# Patient Record
Sex: FEMALE | Race: OTHER | Employment: UNEMPLOYED | URBAN - METROPOLITAN AREA
[De-identification: names, ages, dates, MRNs, and addresses within clinical notes are randomized per-mention and may not be internally consistent; named-entity substitution may affect disease eponyms.]

---

## 2017-05-24 DIAGNOSIS — E76.219 MORQUIO SYNDROME (HCC): Primary | ICD-10-CM

## 2018-03-08 ENCOUNTER — HOSPITAL ENCOUNTER (OUTPATIENT)
Age: 38
Setting detail: SPECIMEN
Discharge: HOME | End: 2018-03-08
Payer: COMMERCIAL

## 2018-03-08 ENCOUNTER — OFFICE VISIT (OUTPATIENT)
Dept: FAMILY MEDICINE CLINIC | Age: 38
End: 2018-03-08
Payer: COMMERCIAL

## 2018-03-08 VITALS
OXYGEN SATURATION: 98 % | WEIGHT: 91.6 LBS | HEIGHT: 55 IN | DIASTOLIC BLOOD PRESSURE: 75 MMHG | SYSTOLIC BLOOD PRESSURE: 111 MMHG | HEART RATE: 72 BPM | BODY MASS INDEX: 21.2 KG/M2

## 2018-03-08 DIAGNOSIS — R07.89 CHEST DISCOMFORT: ICD-10-CM

## 2018-03-08 DIAGNOSIS — Z00.01 ENCOUNTER FOR WELL ADULT EXAM WITH ABNORMAL FINDINGS: Primary | ICD-10-CM

## 2018-03-08 DIAGNOSIS — M77.8 RIGHT ELBOW TENDINITIS: ICD-10-CM

## 2018-03-08 DIAGNOSIS — Z00.01 ENCOUNTER FOR WELL ADULT EXAM WITH ABNORMAL FINDINGS: ICD-10-CM

## 2018-03-08 LAB
-: ABNORMAL
ABSOLUTE EOS #: 0.15 K/UL (ref 0–0.44)
ABSOLUTE IMMATURE GRANULOCYTE: <0.03 K/UL (ref 0–0.3)
ABSOLUTE LYMPH #: 2.43 K/UL (ref 1.1–3.7)
ABSOLUTE MONO #: 0.49 K/UL (ref 0.1–1.2)
ALBUMIN SERPL-MCNC: 4.4 G/DL (ref 3.5–5.2)
ALBUMIN/GLOBULIN RATIO: 1.4 (ref 1–2.5)
ALP BLD-CCNC: 44 U/L (ref 35–104)
ALT SERPL-CCNC: 12 U/L (ref 5–33)
AMORPHOUS: ABNORMAL
ANION GAP SERPL CALCULATED.3IONS-SCNC: 15 MMOL/L (ref 9–17)
AST SERPL-CCNC: 19 U/L
BACTERIA: ABNORMAL
BASOPHILS # BLD: 1 % (ref 0–2)
BASOPHILS ABSOLUTE: 0.05 K/UL (ref 0–0.2)
BILIRUB SERPL-MCNC: 0.38 MG/DL (ref 0.3–1.2)
BILIRUBIN URINE: NEGATIVE
BUN BLDV-MCNC: 14 MG/DL (ref 6–20)
BUN/CREAT BLD: ABNORMAL (ref 9–20)
CALCIUM SERPL-MCNC: 9.5 MG/DL (ref 8.6–10.4)
CASTS UA: ABNORMAL /LPF (ref 0–2)
CHLORIDE BLD-SCNC: 100 MMOL/L (ref 98–107)
CHOLESTEROL/HDL RATIO: 2.4
CHOLESTEROL: 181 MG/DL
CO2: 20 MMOL/L (ref 20–31)
COLOR: YELLOW
COMMENT UA: ABNORMAL
CREAT SERPL-MCNC: 0.42 MG/DL (ref 0.5–0.9)
CRYSTALS, UA: ABNORMAL /HPF
DIFFERENTIAL TYPE: NORMAL
EOSINOPHILS RELATIVE PERCENT: 3 % (ref 1–4)
EPITHELIAL CELLS UA: ABNORMAL /HPF (ref 0–5)
ESTIMATED AVERAGE GLUCOSE: 94 MG/DL
GFR AFRICAN AMERICAN: >60 ML/MIN
GFR NON-AFRICAN AMERICAN: >60 ML/MIN
GFR SERPL CREATININE-BSD FRML MDRD: ABNORMAL ML/MIN/{1.73_M2}
GFR SERPL CREATININE-BSD FRML MDRD: ABNORMAL ML/MIN/{1.73_M2}
GLUCOSE BLD-MCNC: 77 MG/DL (ref 70–99)
GLUCOSE URINE: NEGATIVE
HBA1C MFR BLD: 4.9 % (ref 4–6)
HCT VFR BLD CALC: 38.2 % (ref 36.3–47.1)
HDLC SERPL-MCNC: 75 MG/DL
HEMOGLOBIN: 12 G/DL (ref 11.9–15.1)
IMMATURE GRANULOCYTES: 0 %
KETONES, URINE: NEGATIVE
LDL CHOLESTEROL: 97 MG/DL (ref 0–130)
LEUKOCYTE ESTERASE, URINE: ABNORMAL
LYMPHOCYTES # BLD: 41 % (ref 24–43)
MCH RBC QN AUTO: 29.6 PG (ref 25.2–33.5)
MCHC RBC AUTO-ENTMCNC: 31.4 G/DL (ref 28.4–34.8)
MCV RBC AUTO: 94.1 FL (ref 82.6–102.9)
MONOCYTES # BLD: 8 % (ref 3–12)
MUCUS: ABNORMAL
NITRITE, URINE: NEGATIVE
NRBC AUTOMATED: 0 PER 100 WBC
OTHER OBSERVATIONS UA: ABNORMAL
PDW BLD-RTO: 12.7 % (ref 11.8–14.4)
PH UA: 6 (ref 5–8)
PLATELET # BLD: 443 K/UL (ref 138–453)
PLATELET ESTIMATE: NORMAL
PMV BLD AUTO: 11.3 FL (ref 8.1–13.5)
POTASSIUM SERPL-SCNC: 4.5 MMOL/L (ref 3.7–5.3)
PROTEIN UA: NEGATIVE
RBC # BLD: 4.06 M/UL (ref 3.95–5.11)
RBC # BLD: NORMAL 10*6/UL
RBC UA: ABNORMAL /HPF (ref 0–2)
RENAL EPITHELIAL, UA: ABNORMAL /HPF
SEG NEUTROPHILS: 47 % (ref 36–65)
SEGMENTED NEUTROPHILS ABSOLUTE COUNT: 2.76 K/UL (ref 1.5–8.1)
SODIUM BLD-SCNC: 135 MMOL/L (ref 135–144)
SPECIFIC GRAVITY UA: 1.02 (ref 1–1.03)
THYROXINE, FREE: 1.62 NG/DL (ref 0.93–1.7)
TOTAL PROTEIN: 7.5 G/DL (ref 6.4–8.3)
TRICHOMONAS: ABNORMAL
TRIGL SERPL-MCNC: 44 MG/DL
TSH SERPL DL<=0.05 MIU/L-ACNC: 1.6 MIU/L (ref 0.3–5)
TURBIDITY: ABNORMAL
URINE HGB: ABNORMAL
UROBILINOGEN, URINE: NORMAL
VLDLC SERPL CALC-MCNC: NORMAL MG/DL (ref 1–30)
WBC # BLD: 5.9 K/UL (ref 3.5–11.3)
WBC # BLD: NORMAL 10*3/UL
WBC UA: ABNORMAL /HPF (ref 0–5)
YEAST: ABNORMAL

## 2018-03-08 PROCEDURE — 99213 OFFICE O/P EST LOW 20 MIN: CPT | Performed by: FAMILY MEDICINE

## 2018-03-08 PROCEDURE — 93000 ELECTROCARDIOGRAM COMPLETE: CPT | Performed by: FAMILY MEDICINE

## 2018-03-08 PROCEDURE — 99395 PREV VISIT EST AGE 18-39: CPT | Performed by: FAMILY MEDICINE

## 2018-03-08 RX ORDER — BACLOFEN 10 MG/1
10 TABLET ORAL NIGHTLY
Qty: 30 TABLET | Refills: 1 | Status: SHIPPED | OUTPATIENT
Start: 2018-03-08

## 2018-03-08 RX ORDER — RANITIDINE 150 MG/1
150 TABLET ORAL 2 TIMES DAILY
Qty: 60 TABLET | Refills: 3 | Status: SHIPPED | OUTPATIENT
Start: 2018-03-08

## 2018-03-08 RX ORDER — IBUPROFEN 200 MG
200 TABLET ORAL EVERY 6 HOURS PRN
Qty: 120 TABLET | Refills: 3 | Status: SHIPPED | OUTPATIENT
Start: 2018-03-08

## 2018-03-08 NOTE — PROGRESS NOTES
normal.  She exhibits no distension and no mass. There is no tenderness. There is no rebound and no guarding. Musculoskeletal: Normal range of motion. She exhibits no edema. Tenderness at the posterior R elbow area, especially with palpitation of the tendons and musculoskeletal parts. .   Lymphadenopathy:    She has no cervical adenopathy. Neurological:  She is alert and oriented to person, place, and time. Cranial nerves grossly intact. No sensation problem noted. Muscle strength 5/5 throughout. Skin: Skin is warm and dry. No rash noted. No erythema. Psychiatric:  She has a normal mood and affect. Behavior is normal.    EKG was of normal limits. Sinus rhythm, rate, with normal limits. No ST changes. Charla Hammer was seen today for other and elbow pain. Diagnoses and all orders for this visit:    Encounter for well adult exam with abnormal findings  -     [facility] - [facility]  -     baclofen (LIORESAL) 10 MG tablet; Take 1 tablet by mouth nightly  -     ibuprofen (ADVIL) 200 MG tablet; Take 1 tablet by mouth every 6 hours as needed for Pain  -     ranitidine (ZANTAC) 150 MG tablet; Take 1 tablet by mouth 2 times daily  -     CBC Auto Differential; Future  -     Comprehensive Metabolic Panel; Future  -     Urinalysis; Future  -     TSH without Reflex; Future  -     T4, Free; Future  -     Lipid Panel; Future  -     Hemoglobin A1C; Future  -     Thyroid Peroxidase Antibody; Future    Right elbow tendinitis  -     [facility] - [facility]  -     baclofen (LIORESAL) 10 MG tablet; Take 1 tablet by mouth nightly  -     ibuprofen (ADVIL) 200 MG tablet; Take 1 tablet by mouth every 6 hours as needed for Pain    Chest discomfort  -     EKG 12 Lead  -     XR CHEST STANDARD (2 VW); Future     yearly blood work ordered. Patient will see physical therapy. start baclofen and ADVIL. Call a neurologist for follow-up appointment as needed. I think the patient has acid reflux.   She will start

## 2018-03-09 LAB — THYROID PEROXIDASE (TPO) AB: <10 IU/ML (ref 0–35)

## 2018-03-19 ENCOUNTER — HOSPITAL ENCOUNTER (OUTPATIENT)
Dept: PHYSICAL THERAPY | Facility: CLINIC | Age: 38
Setting detail: THERAPIES SERIES
Discharge: HOME | End: 2018-03-19
Payer: COMMERCIAL

## 2018-03-19 NOTE — FLOWSHEET NOTE
[] Ferdinand Seip        Outpatient Physical                Therapy       [address]       Phone: [phone]       Fax: [phone] [x] [facility]       Promotion at [address] [place] [address]       Phone: [phone]       Fax: [phone] [] [facility]      for [facility]     [address] [place] [address]      Phone: [phone]      Fax:  [phone]     Physical Therapy Cancel/No Show note    Date: 3/19/2018  Patient: Deborah Nguyen  : 1980  MRN: [id number]    Cancels/No Shows to date:     For today's appointment patient:  [x]  Cancelled  []  Rescheduled appointment  []  No-show     Reason given by patient:  []  Patient ill  []  Conflicting appointment  []  No transportation    []  Conflict with work  []  No reason given  []  Weather related  [x]  Other:   Pt wanted to reschedule until after she returns from Spring break.   Did schedule for 18      [x]  Next appointment was confirmed    Electronically signed by: Xiao Fonseca PT

## 2018-04-09 ENCOUNTER — HOSPITAL ENCOUNTER (OUTPATIENT)
Dept: PHYSICAL THERAPY | Facility: CLINIC | Age: 38
Setting detail: THERAPIES SERIES
Discharge: HOME | End: 2018-04-09
Payer: COMMERCIAL

## 2018-04-09 PROCEDURE — 97110 THERAPEUTIC EXERCISES: CPT

## 2018-04-09 PROCEDURE — 97161 PT EVAL LOW COMPLEX 20 MIN: CPT

## 2018-04-13 ENCOUNTER — HOSPITAL ENCOUNTER (OUTPATIENT)
Dept: PHYSICAL THERAPY | Facility: CLINIC | Age: 38
Setting detail: THERAPIES SERIES
Discharge: HOME | End: 2018-04-13
Payer: COMMERCIAL

## 2018-04-13 PROCEDURE — 97140 MANUAL THERAPY 1/> REGIONS: CPT

## 2018-04-13 PROCEDURE — 97110 THERAPEUTIC EXERCISES: CPT

## 2018-04-16 ENCOUNTER — HOSPITAL ENCOUNTER (OUTPATIENT)
Dept: PHYSICAL THERAPY | Facility: CLINIC | Age: 38
Setting detail: THERAPIES SERIES
Discharge: HOME | End: 2018-04-16
Payer: COMMERCIAL

## 2018-04-16 PROCEDURE — 97110 THERAPEUTIC EXERCISES: CPT

## 2018-04-16 PROCEDURE — 97140 MANUAL THERAPY 1/> REGIONS: CPT

## 2018-04-19 ENCOUNTER — HOSPITAL ENCOUNTER (OUTPATIENT)
Dept: PHYSICAL THERAPY | Facility: CLINIC | Age: 38
Setting detail: THERAPIES SERIES
Discharge: HOME | End: 2018-04-19
Payer: COMMERCIAL

## 2018-04-19 PROCEDURE — 97110 THERAPEUTIC EXERCISES: CPT

## 2018-04-19 PROCEDURE — 97140 MANUAL THERAPY 1/> REGIONS: CPT

## 2018-04-23 ENCOUNTER — HOSPITAL ENCOUNTER (OUTPATIENT)
Dept: PHYSICAL THERAPY | Facility: CLINIC | Age: 38
Setting detail: THERAPIES SERIES
Discharge: HOME | End: 2018-04-23
Payer: COMMERCIAL

## 2018-04-23 DIAGNOSIS — Z87.39 H/O DEGENERATIVE DISC DISEASE: Primary | ICD-10-CM

## 2018-04-23 PROCEDURE — 97140 MANUAL THERAPY 1/> REGIONS: CPT

## 2018-04-23 PROCEDURE — 97110 THERAPEUTIC EXERCISES: CPT

## 2018-04-26 ENCOUNTER — HOSPITAL ENCOUNTER (OUTPATIENT)
Dept: PHYSICAL THERAPY | Facility: CLINIC | Age: 38
Setting detail: THERAPIES SERIES
Discharge: HOME | End: 2018-04-26
Payer: COMMERCIAL

## 2018-04-26 PROCEDURE — 97110 THERAPEUTIC EXERCISES: CPT

## 2018-04-26 PROCEDURE — 97140 MANUAL THERAPY 1/> REGIONS: CPT

## 2018-04-30 ENCOUNTER — HOSPITAL ENCOUNTER (OUTPATIENT)
Dept: PHYSICAL THERAPY | Facility: CLINIC | Age: 38
Setting detail: THERAPIES SERIES
Discharge: HOME | End: 2018-04-30
Payer: COMMERCIAL

## 2018-04-30 PROCEDURE — 97161 PT EVAL LOW COMPLEX 20 MIN: CPT

## 2018-04-30 PROCEDURE — 97110 THERAPEUTIC EXERCISES: CPT

## 2018-05-03 ENCOUNTER — HOSPITAL ENCOUNTER (OUTPATIENT)
Dept: PHYSICAL THERAPY | Facility: CLINIC | Age: 38
Setting detail: THERAPIES SERIES
Discharge: HOME | End: 2018-05-03
Payer: COMMERCIAL

## 2018-05-03 PROCEDURE — 97140 MANUAL THERAPY 1/> REGIONS: CPT

## 2018-05-03 PROCEDURE — 97110 THERAPEUTIC EXERCISES: CPT

## 2018-05-08 ENCOUNTER — HOSPITAL ENCOUNTER (OUTPATIENT)
Dept: PHYSICAL THERAPY | Facility: CLINIC | Age: 38
Setting detail: THERAPIES SERIES
Discharge: HOME | End: 2018-05-08
Payer: COMMERCIAL

## 2018-05-08 PROCEDURE — 97140 MANUAL THERAPY 1/> REGIONS: CPT

## 2018-05-08 PROCEDURE — 97110 THERAPEUTIC EXERCISES: CPT

## 2018-05-11 ENCOUNTER — HOSPITAL ENCOUNTER (OUTPATIENT)
Dept: PHYSICAL THERAPY | Facility: CLINIC | Age: 38
Setting detail: THERAPIES SERIES
Discharge: HOME | End: 2018-05-11
Payer: COMMERCIAL

## 2018-05-15 ENCOUNTER — HOSPITAL ENCOUNTER (OUTPATIENT)
Dept: PHYSICAL THERAPY | Facility: CLINIC | Age: 38
Setting detail: THERAPIES SERIES
Discharge: HOME | End: 2018-05-15
Payer: COMMERCIAL

## 2018-05-15 PROCEDURE — 97140 MANUAL THERAPY 1/> REGIONS: CPT

## 2018-05-15 PROCEDURE — 97110 THERAPEUTIC EXERCISES: CPT

## 2018-05-18 ENCOUNTER — HOSPITAL ENCOUNTER (OUTPATIENT)
Dept: PHYSICAL THERAPY | Facility: CLINIC | Age: 38
Setting detail: THERAPIES SERIES
Discharge: HOME | End: 2018-05-18
Payer: COMMERCIAL

## 2018-05-18 PROCEDURE — 97140 MANUAL THERAPY 1/> REGIONS: CPT

## 2018-05-18 PROCEDURE — 97110 THERAPEUTIC EXERCISES: CPT

## 2018-05-21 NOTE — FLOWSHEET NOTE
[] Ruslan Durham        Outpatient Physical                Therapy       [address]       Phone: [phone]       Fax: [phone] [x] [facility] at [address] [facility]       Phone: [phone]       Fax: [phone] [] [place].  [address] [place] [address]      Phone: [phone]      Fax:  [phone]     Physical Therapy Cancel/No Show note    Date: 2018  Patient: Cody Bright  : 1980  MRN: [id number]    Cancels/No Shows to date:     For 18  appointment patient:  [x]  Cancelled  []  Rescheduled appointment  []  No-show     Reason given by patient:  []  Patient ill  []  Conflicting appointment  []  No transportation    []  Conflict with work  []  No reason given  []  Weather related  []  Other:      Comments:      [x]  Next appointment was confirmed    Electronically signed by: Lana Lopez PTA

## 2018-05-22 ENCOUNTER — HOSPITAL ENCOUNTER (OUTPATIENT)
Dept: PHYSICAL THERAPY | Facility: CLINIC | Age: 38
Setting detail: THERAPIES SERIES
Discharge: HOME | End: 2018-05-22
Payer: COMMERCIAL

## 2018-05-25 ENCOUNTER — HOSPITAL ENCOUNTER (OUTPATIENT)
Dept: PHYSICAL THERAPY | Facility: CLINIC | Age: 38
Setting detail: THERAPIES SERIES
Discharge: HOME | End: 2018-05-25
Payer: COMMERCIAL

## 2018-05-25 PROCEDURE — 97110 THERAPEUTIC EXERCISES: CPT

## 2018-05-31 ENCOUNTER — HOSPITAL ENCOUNTER (OUTPATIENT)
Dept: PHYSICAL THERAPY | Facility: CLINIC | Age: 38
Setting detail: THERAPIES SERIES
Discharge: HOME | End: 2018-05-31
Payer: COMMERCIAL

## 2018-05-31 PROCEDURE — 97110 THERAPEUTIC EXERCISES: CPT

## 2018-06-05 ENCOUNTER — HOSPITAL ENCOUNTER (OUTPATIENT)
Dept: PHYSICAL THERAPY | Facility: CLINIC | Age: 38
Setting detail: THERAPIES SERIES
Discharge: HOME | End: 2018-06-05
Payer: COMMERCIAL

## 2018-06-05 PROCEDURE — 97110 THERAPEUTIC EXERCISES: CPT

## 2018-06-07 ENCOUNTER — HOSPITAL ENCOUNTER (OUTPATIENT)
Dept: PHYSICAL THERAPY | Facility: CLINIC | Age: 38
Setting detail: THERAPIES SERIES
Discharge: HOME | End: 2018-06-07
Payer: COMMERCIAL

## 2018-06-12 ENCOUNTER — HOSPITAL ENCOUNTER (OUTPATIENT)
Dept: PHYSICAL THERAPY | Facility: CLINIC | Age: 38
Setting detail: THERAPIES SERIES
Discharge: HOME | End: 2018-06-12
Payer: COMMERCIAL

## 2018-06-12 PROCEDURE — 97110 THERAPEUTIC EXERCISES: CPT

## 2018-06-14 ENCOUNTER — HOSPITAL ENCOUNTER (OUTPATIENT)
Dept: PHYSICAL THERAPY | Facility: CLINIC | Age: 38
Setting detail: THERAPIES SERIES
Discharge: HOME | End: 2018-06-14
Payer: COMMERCIAL

## 2018-06-14 PROCEDURE — 97140 MANUAL THERAPY 1/> REGIONS: CPT

## 2018-06-14 PROCEDURE — 97110 THERAPEUTIC EXERCISES: CPT

## 2018-06-19 ENCOUNTER — HOSPITAL ENCOUNTER (OUTPATIENT)
Dept: PHYSICAL THERAPY | Facility: CLINIC | Age: 38
Setting detail: THERAPIES SERIES
Discharge: HOME | End: 2018-06-19
Payer: COMMERCIAL

## 2018-06-19 PROCEDURE — 97140 MANUAL THERAPY 1/> REGIONS: CPT

## 2018-06-19 PROCEDURE — 97110 THERAPEUTIC EXERCISES: CPT

## 2018-06-21 ENCOUNTER — HOSPITAL ENCOUNTER (OUTPATIENT)
Dept: PHYSICAL THERAPY | Facility: CLINIC | Age: 38
Setting detail: THERAPIES SERIES
Discharge: HOME | End: 2018-06-21
Payer: COMMERCIAL

## 2018-06-21 PROCEDURE — 97140 MANUAL THERAPY 1/> REGIONS: CPT

## 2018-06-21 PROCEDURE — 97110 THERAPEUTIC EXERCISES: CPT

## 2018-06-26 ENCOUNTER — HOSPITAL ENCOUNTER (OUTPATIENT)
Dept: PHYSICAL THERAPY | Facility: CLINIC | Age: 38
Setting detail: THERAPIES SERIES
Discharge: HOME | End: 2018-06-26
Payer: COMMERCIAL

## 2018-06-26 ENCOUNTER — TELEPHONE (OUTPATIENT)
Dept: OBGYN CLINIC | Age: 38
End: 2018-06-26

## 2018-06-26 PROCEDURE — 97110 THERAPEUTIC EXERCISES: CPT

## 2018-06-26 NOTE — TELEPHONE ENCOUNTER
Harvinder ARNOLD is requesting an order for orthotics fitting. Dx: low back pain.        Fax: [phone]

## 2018-06-27 ENCOUNTER — HOSPITAL ENCOUNTER (OUTPATIENT)
Dept: PHYSICAL THERAPY | Facility: CLINIC | Age: 38
Setting detail: THERAPIES SERIES
Discharge: HOME | End: 2018-06-27
Payer: COMMERCIAL

## 2018-06-27 PROCEDURE — 97760 ORTHOTIC MGMT&TRAING 1ST ENC: CPT

## 2018-07-25 ENCOUNTER — OFFICE VISIT (OUTPATIENT)
Dept: PSYCHOLOGY | Age: 38
End: 2018-07-25
Payer: COMMERCIAL

## 2018-07-25 DIAGNOSIS — F41.1 GENERALIZED ANXIETY DISORDER: Primary | ICD-10-CM

## 2018-07-25 PROCEDURE — 90834 PSYTX W PT 45 MINUTES: CPT | Performed by: PSYCHOLOGIST

## 2018-07-25 ASSESSMENT — PATIENT HEALTH QUESTIONNAIRE - PHQ9
2. FEELING DOWN, DEPRESSED OR HOPELESS: 0
1. LITTLE INTEREST OR PLEASURE IN DOING THINGS: 1
SUM OF ALL RESPONSES TO PHQ9 QUESTIONS 1 & 2: 1
SUM OF ALL RESPONSES TO PHQ QUESTIONS 1-9: 1

## 2018-08-01 ENCOUNTER — OFFICE VISIT (OUTPATIENT)
Dept: PSYCHOLOGY | Age: 38
End: 2018-08-01
Payer: COMMERCIAL

## 2018-08-01 DIAGNOSIS — F41.1 GENERALIZED ANXIETY DISORDER: Primary | ICD-10-CM

## 2018-08-01 PROCEDURE — 90837 PSYTX W PT 60 MINUTES: CPT | Performed by: PSYCHOLOGIST

## 2018-08-01 NOTE — PROGRESS NOTES
until then. O:  MSE:   Mood was anxious with congruent affect. Suicidal ideation was denied. Homicidal ideation was denied. Hygiene was good . Dress was appropriate. Behavior was Restless & fidgety with No observation or self-report of difficulties ambulating. Attitude was Help-seeking. Eye-contact was good. Speech: rate- WNL, rhythm-  WNL, volume- WNL  Verbalizations were  tangential and verbose. Thought processes were not intact and goal-oriented without evidence of delusions, hallucinations, obsessions, or janelle; with moderate cognitive distortions. Associations were characterized by tangential and flight of ideas cognitive processes. Pt was orientated oriented to person, place, time, and general circumstances;  Memory was good  Insight and judgment were estimated to be fair, AEB, a fair  understanding of cyclical maladaptive patterns, and the ability to use insight to inform behavior change.      A:   PHQ-9 Total Score: 1 (7/25/2018  2:47 PM)     Patient completed the Adult Self-Report Scale (ASRS) Symptom Checklist. Patient reported that she \"sometimes\" has difficulties wrapping up the final details of a project, once the challenging parts have been done. She stated that \"often\" she has difficulties getting things in order when she has to do a task that requires organization. She mentioned that \"very often\" she has problems remembering appointments or obligations. She stated that \"often\" she fidgets or squirms with her hands and feet when she has to sit down for a long time. She noted that \"often\" she has difficulty keeping her attention when she is doing a boring or repetitive task. She shared that [facility] often\" she has difficulty concentrating on what people say to her, even when they are speaking to her directly. She stated that \"often\" she gets distracted by activity or noise around her. She indicated that often she feels restless or fidgety.  She stated that \"sometimes\" she finds herself finishing other people's sentences before they can finish and interrupt others when they are busy. Pt interventions:  Provided handout on  Mindfulness attitudes, Trained in relaxation strategies, Discussed self-care (sleep, nutrition, rewarding activities, social support, exercise), Motivational Interviewing to determine importance and readiness for change, Established rapport, Supportive techniques, Provided Psychoeducation re: Mindfulness, Explained relaxed breathing technique in detail and practiced this with pt in visit, Emphasized importance of regular practice of relaxation strategies to target / promote Stress reduction, Collaborative treatment planning,Clarified role of [facility] in primary care,Recommended that pt establish with a mental health clinician with whom they can meet regularly for psychotherapy services and Reviewed options for identifying appropriate providers      Pt Behavioral Change Plan:   Patient Instructions   1. Use relaxation/mood Apps  2. Attend individual therapy and assessment with Bryanna. 3. Practice being in the present moment   4. Practice breathing exercises   5. Read mindfulness attitudes and notice         Diagnosis:  The encounter diagnosis was Generalized anxiety disorder. History:    Medications:   Current Outpatient Prescriptions   Medication Sig Dispense Refill    Misc. Devices MISC 1 each by Does not apply route once for 1 dose 1 each 0    baclofen (LIORESAL) 10 MG tablet Take 1 tablet by mouth nightly 30 tablet 1    ibuprofen (ADVIL) 200 MG tablet Take 1 tablet by mouth every 6 hours as needed for Pain 120 tablet 3    ranitidine (ZANTAC) 150 MG tablet Take 1 tablet by mouth 2 times daily 60 tablet 3     No current facility-administered medications for this visit.         Social History:   Social History     Social History    Marital status:      Spouse name: N/A    Number of children: N/A    Years of education: N/A Occupational History    Not on file. Social History Main Topics    Smoking status: Former Smoker    Smokeless tobacco: Never Used    Alcohol use Yes      Comment: Social    Drug use: No    Sexual activity: Not on file     Other Topics Concern    Not on file     Social History Narrative    No narrative on file       TOBACCO:   reports that she has quit smoking. She has never used smokeless tobacco.  ETOH:   reports that she drinks alcohol. Family History:   No family history on file. Electronically signed by BETHANY Mendoza on 8/17/2018 at 8:02 AM